# Patient Record
Sex: FEMALE | Race: WHITE
[De-identification: names, ages, dates, MRNs, and addresses within clinical notes are randomized per-mention and may not be internally consistent; named-entity substitution may affect disease eponyms.]

---

## 2019-03-22 ENCOUNTER — HOSPITAL ENCOUNTER (EMERGENCY)
Dept: HOSPITAL 97 - ER | Age: 74
Discharge: HOME | End: 2019-03-22
Payer: COMMERCIAL
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
## 2019-03-22 DIAGNOSIS — S92.355A: Primary | ICD-10-CM
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
## 2019-03-22 PROCEDURE — 99283 EMERGENCY DEPT VISIT LOW MDM: CPT

## 2019-03-22 NOTE — EDPHYS
Physician Documentation                                                                           

 [facility]                                                                 

Name: Arianna Burr                                                                                  

Age: 73 yrs                                                                                       

Sex: Female                                                                                       

: 1945                                                                                   

MRN: [id number]                                                                                   

Arrival Date: 2019                                                                          

Time: 18:09                                                                                       

Account#: [id number]                                                                            

Bed 9                                                                                             

Private MD: Gunnar Milton V                                                                      

ED Physician Evens López                                                                      

HPI:                                                                                              

                                                                                             

19:35 This 73 yrs old  Female presents to ER via Ambulatory with complaints of Foot  kb  

      Injury.                                                                                     

19:35 The patient presents with an injury, pain. The complaints affect the left foot.         kb  

      Context: The problem was sustained at home, outdoors, resulted from twisting, Mechanism     

      of Injury: Inversion the patient can fully bear weight, the patient is able to              

      ambulate. Onset: The symptoms/episode began/occurred just prior to arrival. Modifying       

      factors: The symptoms are alleviated by nothing, the symptoms are aggravated by weight      

      bearing. Associated signs and symptoms: The patient has no apparent associated signs or     

      symptoms. Severity of symptoms: At their worst the symptoms were moderate, in the           

      emergency department the symptoms are unchanged. The patient has experienced a previous     

      episode, on opposite foot. The patient has not recently seen a physician.                   

                                                                                                  

Historical:                                                                                       

- Allergies:                                                                                      

18:39 No Known Allergies;                                                                     ch  

- PMHx:                                                                                           

18:39 None;                                                                                   ch  

- PSHx:                                                                                           

18:39 thyroid irradiated; Tonsillectomy;                                                      ch  

                                                                                                  

- Immunization history:: Adult Immunizations up to date.                                          

- Social history:: Smoking status: Patient/guardian denies using tobacco,                         

  Patient/guardian denies using alcohol, street drugs.                                            

- Ebola Screening: : Patient negative for fever greater than or equal to 101.5 degrees            

  Fahrenheit, and additional compatible Ebola Virus Disease symptoms Patient denies               

  exposure to infectious person Patient denies travel to an Ebola-affected area in the            

  21 days before illness onset No symptoms or risks identified at this time.                      

                                                                                                  

                                                                                                  

ROS:                                                                                              

19:34 Constitutional: Negative for fever, chills, and weight loss, Cardiovascular: Negative   kb  

      for chest pain, palpitations, and edema, Respiratory: Negative for shortness of breath,     

      cough, wheezing, and pleuritic chest pain, Abdomen/GI: Negative for abdominal pain,         

      nausea, vomiting, diarrhea, and constipation, Skin: Negative for injury, rash, and          

      discoloration, Neuro: Negative for headache, weakness, numbness, tingling, and seizure.     

19:34 MS/extremity: Positive for injury or acute deformity, pain, tenderness.                     

                                                                                                  

Exam:                                                                                             

19:32 Constitutional:  This is a well developed, well nourished patient who is awake, alert,  kb  

      and in no acute distress. Head/Face:  Normocephalic, atraumatic. Chest/axilla:  Normal      

      chest wall appearance and motion.  Nontender with no deformity.  No lesions are             

      appreciated. Cardiovascular:  Regular rate and rhythm with a normal S1 and S2.  No          

      gallops, murmurs, or rubs.  Normal PMI, no JVD.  No pulse deficits. Respiratory:  Lungs     

      have equal breath sounds bilaterally, clear to auscultation and percussion.  No rales,      

      rhonchi or wheezes noted.  No increased work of breathing, no retractions or nasal          

      flaring. Abdomen/GI:  Soft, non-tender, with normal bowel sounds.  No distension or         

      tympany.  No guarding or rebound.  No evidence of tenderness throughout. Skin:  Warm,       

      dry with normal turgor.  Normal color with no rashes, no lesions, and no evidence of        

      cellulitis. Neuro:  Awake and alert, GCS 15, oriented to person, place, time, and           

      situation.  Cranial nerves II-XII grossly intact.  Motor strength 5/5 in all                

      extremities.  Sensory grossly intact.  Cerebellar exam normal.  Normal gait.                

19:32 Musculoskeletal/extremity: Extremities: grossly normal except: noted in the dorsum of       

      left foot: pain, tenderness, ROM: intact in all extremities, Circulation is intact in       

      all extremities. Sensation intact. Weight bearing: able to fully bear weight.               

                                                                                                  

Vital Signs:                                                                                      

18:39  / 87; Pulse 62; Resp 16; Temp 98.6; Pulse Ox 98% on R/A; Weight 65.77 kg; Height ch  

      5 ft. 3 in. (160.02 cm); Pain 3/10;                                                         

18:39 Body Mass Index 25.69 (65.77 kg, 160.02 cm)                                             ch  

                                                                                                  

MDM:                                                                                              

18:41 Patient medically screened.                                                             kb  

19:32 Data reviewed: vital signs, nurses notes. Data interpreted: Pulse oximetry: on room air kb  

      is 98 %. Interpretation: normal. Counseling: I had a detailed discussion with the           

      patient and/or guardian regarding: the historical points, exam findings, and any            

      diagnostic results supporting the discharge/admit diagnosis, radiology results, the         

      need for outpatient follow up, a orthopedic surgeon, to return to the emergency             

      department if symptoms worsen or persist or if there are any questions or concerns that     

      arise at home.                                                                              

                                                                                                  

                                                                                             

18:45 Order name: Foot Left 3 View XRAY; Complete Time: 19:48                                 kb  

                                                                                             

19:37 Order name: Post-op shoe; Complete Time: 20:06                                          kb  

                                                                                                  

Administered Medications:                                                                         

No medications were administered                                                                  

                                                                                                  

                                                                                                  

Disposition:                                                                                      

19 19:37 Discharged to Home. Impression: Nondisplaced fracture of fifth metatarsal bone,    

  left foot.                                                                                      

- Condition is Stable.                                                                            

- Discharge Instructions: Metatarsal Fracture.                                                    

- Prescriptions for Tramadol 50 mg Oral Tablet - take 1 tablet by ORAL route every 8              

  hours as needed; 12 tablet.                                                                     

- Medication Reconciliation Form, Thank You Letter, Antibiotic Education, Prescription            

  Opioid Use form.                                                                                

- Follow up: Emergency Department; When: As needed; Reason: Worsening of condition.               

  Follow up: Private Physician; When: 2 - 3 days; Reason: Recheck today's complaints,             

  Continuance of care, Re-evaluation by your physician.                                           

                                                                                                  

                                                                                                  

                                                                                                  

Addendum:                                                                                         

2019                                                                                        

     10:11 Co-signature as Attending Physician, Evens López MD I agree with the assessment and  c
ha

           plan of care.                                                                          

                                                                                                  

Signatures:                                                                                       

Dispatcher [employer]                           EDMS                                                 

Taylor Cardenas, FNP-C                 FNP-Ckb                                                   

Brina Ponce, RN                  RN   Evens Andres MD MD cha Vicente, Ronaldo, RN                    RN   rv                                                   

                                                                                                  

Corrections: (The following items were deleted from the chart)                                    

                                                                                             

20:08 19:37 2019 19:37 Discharged to Home. Impression: Nondisplaced fracture of fifth   rv  

      metatarsal bone, left foot. Condition is Stable. Forms are Medication Reconciliation        

      Form, Thank You Letter, Antibiotic Education, Prescription Opioid Use. Follow up:           

      Emergency Department; When: As needed; Reason: Worsening of condition. Follow up:           

      Private Physician; When: 2 - 3 days; Reason: Recheck today's complaints, Continuance of     

      care, Re-evaluation by your physician. kb                                                   

                                                                                                  

**************************************************************************************************

## 2019-03-22 NOTE — ER
Nurse's Notes                                                                                     

 [facility]                                                                 

Name: Arianna Burr                                                                                  

Age: 73 yrs                                                                                       

Sex: Female                                                                                       

: 1945                                                                                   

MRN: [id number]                                                                                   

Arrival Date: 2019                                                                          

Time: 18:09                                                                                       

Account#: [id number]                                                                            

Bed 9                                                                                             

Private MD: Gunnar Milton V                                                                      

Diagnosis: Nondisplaced fracture of fifth metatarsal bone, left foot                              

                                                                                                  

Presentation:                                                                                     

                                                                                             

18:38 Presenting complaint: Patient states: pain to L foot after rolling foot while walking   ch  

      in the garden. Transition of care: patient was not received from another setting of         

      care. Onset of symptoms was 2019 at 16:00. Risk Assessment: Do you want to        

      hurt yourself or someone else? Patient reports no desire to harm self or others.            

      Initial Sepsis Screen: Does the patient meet any 2 criteria? No. Patient's initial          

      sepsis screen is negative. Does the patient have a suspected source of infection? No.       

      Patient's initial sepsis screen is negative. Care prior to arrival: None.                   

18:38 Method Of Arrival: Ambulatory                                                             

18:38 Acuity: DARIO 4                                                                           ch  

                                                                                                  

Triage Assessment:                                                                                

18:39 General: Appears in no apparent distress. comfortable, Behavior is calm, cooperative,   ch  

      appropriate for age. Pain: Complains of pain in left foot. Musculoskeletal:                 

      Circulation, motion, and sensation intact. Capillary refill < 3 seconds, in bilateral       

      fingers. toes. Injury Description: twisted.                                                 

                                                                                                  

Historical:                                                                                       

- Allergies:                                                                                      

18:39 No Known Allergies;                                                                     ch  

- PMHx:                                                                                           

18:39 None;                                                                                   ch  

- PSHx:                                                                                           

18:39 thyroid irradiated; Tonsillectomy;                                                      ch  

                                                                                                  

- Immunization history:: Adult Immunizations up to date.                                          

- Social history:: Smoking status: Patient/guardian denies using tobacco,                         

  Patient/guardian denies using alcohol, street drugs.                                            

- Ebola Screening: : Patient negative for fever greater than or equal to 101.5 degrees            

  Fahrenheit, and additional compatible Ebola Virus Disease symptoms Patient denies               

  exposure to infectious person Patient denies travel to an Ebola-affected area in the            

  21 days before illness onset No symptoms or risks identified at this time.                      

                                                                                                  

                                                                                                  

Screenin:07 Abuse screen: Denies threats or abuse. Denies injuries from another. Nutritional        rv  

      screening: No deficits noted. Tuberculosis screening: No symptoms or risk factors           

      identified. Fall Risk None identified.                                                      

                                                                                                  

Assessment:                                                                                       

20:06 General: Appears in no apparent distress. uncomfortable, Behavior is calm, cooperative. rv  

      Pain: Complains of pain in left foot. Neuro: Level of Consciousness is awake, alert,        

      obeys commands, Oriented to person, place, time, situation. Cardiovascular: Capillary       

      refill < 3 seconds. Respiratory: Airway is patent. GI: No signs and/or symptoms were        

      reported involving the gastrointestinal system. : No signs and/or symptoms were           

      reported regarding the genitourinary system. EENT: No signs and/or symptoms were            

      reported regarding the EENT system. Derm: Skin is intact. Musculoskeletal: Reports pain     

      in left foot.                                                                               

                                                                                                  

Vital Signs:                                                                                      

18:39  / 87; Pulse 62; Resp 16; Temp 98.6; Pulse Ox 98% on R/A; Weight 65.77 kg; Height ch  

      5 ft. 3 in. (160.02 cm); Pain 3/10;                                                         

18:39 Body Mass Index 25.69 (65.77 kg, 160.02 cm)                                               

                                                                                                  

ED Course:                                                                                        

18:09 Patient arrived in ED.                                                                  as  

18:10 Gunnar Milton MD is Private Physician.                                                 as  

18:39 Triage completed.                                                                         

18:39 Arm band placed on left wrist. Patient placed in an exam room, on a stretcher.            

18:41 Taylor Cardenas FNP-C is [facility].                                                        kb  

18:41 Evens López MD is Attending Physician.                                             kb  

19:14 Foot Left 3 View XRAY In Process Unspecified.                                           EDMS

20:07 Patient has correct armband on for positive identification. Call light in reach. Side   rv  

      rails up X 1. Pulse ox on.                                                                  

20:08 No provider procedures requiring assistance completed. Patient did not have IV access   rv  

      during this emergency room visit.                                                           

                                                                                                  

Administered Medications:                                                                         

No medications were administered                                                                  

                                                                                                  

                                                                                                  

Outcome:                                                                                          

19:37 Discharge ordered by MD.                                                                kb  

20:08 Discharged to home ambulatory.                                                          rv  

20:08 Condition: good                                                                             

20:08 Discharge instructions given to patient, family, Instructed on discharge instructions,      

      follow up and referral plans. medication usage, Demonstrated understanding of               

      instructions, follow-up care, medications, Prescriptions given X 1.                         

20:08 Patient left the ED.                                                                    rv  

                                                                                                  

Signatures:                                                                                       

Dispatcher [employer]                           EDMS                                                 

Taylor Cardenas FNP-C FNP-Ckb Hammond, Christina, RN                  RN                                                      

Jonathan, Adilene                             as                                                   

Torres, Clyde, RN                    RN   rv                                                   

                                                                                                  

**************************************************************************************************

## 2019-03-22 NOTE — RAD REPORT
EXAM DESCRIPTION:  RAD - Foot Left 3 View - 3/22/2019 7:14 pm

 

CLINICAL HISTORY:  Left Foot pain status post injury

 

FINDINGS:  An oblique lucency is present within the proximal diaphysis of the fifth metatarsal. It is
 equivocal for a stress fracture and should be correlated clinically.

 

No dislocation noted

 

 The bones are osteoporotic